# Patient Record
Sex: MALE | Race: WHITE | NOT HISPANIC OR LATINO | URBAN - METROPOLITAN AREA
[De-identification: names, ages, dates, MRNs, and addresses within clinical notes are randomized per-mention and may not be internally consistent; named-entity substitution may affect disease eponyms.]

---

## 2023-03-07 ENCOUNTER — EMERGENCY (EMERGENCY)
Facility: HOSPITAL | Age: 23
LOS: 1 days | Discharge: HOME | End: 2023-03-07
Attending: EMERGENCY MEDICINE
Payer: SELF-PAY

## 2023-03-07 VITALS
TEMPERATURE: 98 F | SYSTOLIC BLOOD PRESSURE: 165 MMHG | RESPIRATION RATE: 16 BRPM | OXYGEN SATURATION: 99 % | HEART RATE: 74 BPM | DIASTOLIC BLOOD PRESSURE: 84 MMHG

## 2023-03-07 LAB
D DIMER BLD IA.RAPID-MCNC: <150 NG/ML DDU — SIGNIFICANT CHANGE UP
TROPONIN T SERPL-MCNC: <0.01 NG/ML — SIGNIFICANT CHANGE UP (ref 0–0.06)

## 2023-03-07 PROCEDURE — 99285 EMERGENCY DEPT VISIT HI MDM: CPT

## 2023-03-07 PROCEDURE — 71046 X-RAY EXAM CHEST 2 VIEWS: CPT | Mod: 26

## 2023-03-07 RX ORDER — IBUPROFEN 200 MG
600 TABLET ORAL ONCE
Refills: 0 | Status: ACTIVE | OUTPATIENT
Start: 2023-03-07 | End: 2023-03-07

## 2023-03-07 RX ORDER — LIDOCAINE 4 G/100G
1 CREAM TOPICAL ONCE
Refills: 0 | Status: ACTIVE | OUTPATIENT
Start: 2023-03-07 | End: 2023-03-07

## 2023-03-07 RX ORDER — IBUPROFEN 200 MG
1 TABLET ORAL
Qty: 20 | Refills: 0
Start: 2023-03-07 | End: 2023-03-11

## 2023-03-07 RX ORDER — ACETAMINOPHEN 500 MG
2 TABLET ORAL
Qty: 56 | Refills: 0
Start: 2023-03-07 | End: 2023-03-13

## 2023-03-07 RX ORDER — MENTHOL AND METHYL SALICYLATE 10; 30 G/100G; G/100G
1 CREAM TOPICAL
Qty: 10 | Refills: 0
Start: 2023-03-07 | End: 2023-03-16

## 2023-03-07 NOTE — ED ADULT NURSE NOTE - OBJECTIVE STATEMENT
22 year old male presents to ED with c/o 4 days of intermittent chest pain described as stabbing in nature.  Pain is non radiating.  Pain occurred while at rest at home.  Denies N/V, diaphoresis.  Denies smoking or drug use.  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  Denies family and/or personal cardiac history.  Seen and evaluated by provider, orders obtained and noted.  Blood specimens obtained and sent to lab.  Offers no further complaints at this time.

## 2023-03-07 NOTE — ED PROVIDER NOTE - PATIENT PORTAL LINK FT
You can access the FollowMyHealth Patient Portal offered by [facility] by registering at the following website: [email or web address]. By joining [facility]’s FollowMyHealth portal, you will also be able to view your health information using other applications (apps) compatible with our system.

## 2023-03-07 NOTE — ED PROVIDER NOTE - NSFOLLOWUPINSTRUCTIONS_ED_ALL_ED_FT
tylenol alternativo y motrin  seguir con la clínica o el médico de cabecera, así jose derivación de cardiología  Los síntomas nuevos o que empeoran regresan al servicio de urgencias.      Dolor en el pecho    El dolor de pecho puede ser causado por muchas condiciones diferentes que pueden o no ser peligrosas. Las causas incluyen acidez estomacal, infecciones pulmonares, ataque cardíaco, coágulos de jalen en los pulmones, infecciones de la piel, tensión o daño en los músculos, cartílagos o huesos, etc. Además de la historia clínica y el examen físico, un electrocardiograma (ECG) u otras pruebas de laboratorio pueden se suggs realizado para determinar la causa de reyes dolor de pecho. Ivory un seguimiento con reyes proveedor de atención primaria o con un cardiólogo según las instrucciones.    BUSQUE ATENCIÓN MÉDICA DE INMEDIATO SI TIENE ALGUNO DE LOS SIGUIENTES SÍNTOMAS: empeoramiento del dolor en el pecho, tos con jalen, dolor inexplicable de espalda/chidi/mandíbula, dolor abdominal intenso, mareos o aturdimiento, desmayo, dificultad para respirar, piel sudorosa o húmeda, vómitos, o latidos cardíacos acelerados. Estos síntomas pueden representar un problema grave que es george emergencia. No espere a april si los síntomas desaparecen. Obtenga ayuda médica de inmediato. Llame al 911 y no conduzca hasta el hospital.    Si usted no tiene un médico de primaria por favor llame para hacer george jackson en cualquiera de las clínicas de atención primaria que se enumeran a continuación:    [place] [address]  Phone: [phone]    [place] [address]  Phone: [phone]

## 2023-03-07 NOTE — ED PROVIDER NOTE - OBJECTIVE STATEMENT
21 y/o male denies PMHx presents to the ED c/o 4 days of intermittent chest pain described as stabbing in nature. Pt states the first episode occurred while he was sitting at home at rest. Pt denies associated N/V, diaphoresis. Pt denies smoking, drug use. Pt denies hx of MI in immediate family. Pt denies personal cardiac hx. 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    : Calixto

## 2023-03-07 NOTE — ED PROVIDER NOTE - NS ED ATTENDING STATEMENT MOD
This was a shared visit with the LYNDSEY. I reviewed and verified the documentation and independently performed the documented:

## 2023-03-07 NOTE — ED PROVIDER NOTE - ATTENDING APP SHARED VISIT CONTRIBUTION OF CARE
I, Jaimie Sanford, performed the initial face to face bedside interview with this patient regarding history of present illness, review of symptoms and relevant past medical, social and family history.  I completed an independent physical examination.  I was the initial provider who evaluated this patient. I have signed out the follow up of any pending tests (i.e. labs, radiological studies) to the ACP.  I have communicated the patient’s plan of care and disposition with the ACP.  The history, relevant review of systems, past medical and surgical history, medical decision making, and physical examination was documented by the scribe in my presence and I attest to the accuracy of the documentation.

## 2023-03-07 NOTE — ED PROVIDER NOTE - NSFOLLOWUPCLINICS_GEN_ALL_ED_FT
[facility]  Cardiology  [address], [address]  [place] [address]  Phone: [phone]  Fax:   Follow Up Time: Urgent

## 2023-03-07 NOTE — ED PROVIDER NOTE - CLINICAL SUMMARY MEDICAL DECISION MAKING FREE TEXT BOX
Pt with 4 days of stabbing intermittent chest pain, no other risk factors besides being male, will check labs, CXR, ekg, reassess. Pt with 4 days of stabbing intermittent chest pain, no other risk factors besides being male, will check labs, CXR, ekg, reassess.    DOMINGO DAVE: please see progress

## 2023-03-07 NOTE — ED PROVIDER NOTE - PROGRESS NOTE DETAILS
DOMINGO Lentz: PT evaluated by intake physician. HPI/PE/ROS as noted above. Will follow up plan per intake physician   eta intprtr- labs stable ekg non ischemic, xray without focal consolidation. dc with fu with clinic and with cardiology referral

## 2023-03-08 PROCEDURE — 84484 ASSAY OF TROPONIN QUANT: CPT

## 2023-03-08 PROCEDURE — 85379 FIBRIN DEGRADATION QUANT: CPT

## 2023-03-08 PROCEDURE — T1013: CPT

## 2023-03-08 PROCEDURE — 99283 EMERGENCY DEPT VISIT LOW MDM: CPT | Mod: 25

## 2023-03-08 PROCEDURE — 36415 COLL VENOUS BLD VENIPUNCTURE: CPT

## 2023-03-08 PROCEDURE — 71046 X-RAY EXAM CHEST 2 VIEWS: CPT
